# Patient Record
Sex: FEMALE
[De-identification: names, ages, dates, MRNs, and addresses within clinical notes are randomized per-mention and may not be internally consistent; named-entity substitution may affect disease eponyms.]

---

## 2017-02-10 ENCOUNTER — CHARTING TRANS (OUTPATIENT)
Dept: OTHER | Age: 22
End: 2017-02-10

## 2017-02-10 ENCOUNTER — LAB SERVICES (OUTPATIENT)
Dept: OTHER | Age: 22
End: 2017-02-10

## 2017-02-10 LAB — RAPID STREP GROUP A: NORMAL

## 2018-11-05 VITALS
OXYGEN SATURATION: 97 % | BODY MASS INDEX: 30.81 KG/M2 | HEART RATE: 77 BPM | SYSTOLIC BLOOD PRESSURE: 100 MMHG | TEMPERATURE: 98.4 F | DIASTOLIC BLOOD PRESSURE: 72 MMHG | HEIGHT: 66 IN | WEIGHT: 191.7 LBS

## 2023-11-08 ENCOUNTER — TELEMEDICINE (OUTPATIENT)
Dept: BEHAVIORAL HEALTH | Facility: CLINIC | Age: 28
End: 2023-11-08
Payer: COMMERCIAL

## 2023-11-08 DIAGNOSIS — F42.2 MIXED OBSESSIONAL THOUGHTS AND ACTS: ICD-10-CM

## 2023-11-08 PROBLEM — F42.9 OCD (OBSESSIVE COMPULSIVE DISORDER): Status: ACTIVE | Noted: 2023-11-08

## 2023-11-08 PROBLEM — F90.0 ADHD (ATTENTION DEFICIT HYPERACTIVITY DISORDER), INATTENTIVE TYPE: Status: ACTIVE | Noted: 2023-11-08

## 2023-11-08 PROBLEM — F41.0 PANIC ATTACKS: Status: ACTIVE | Noted: 2023-11-08

## 2023-11-08 PROBLEM — G25.69 TICS OF ORGANIC ORIGIN: Status: ACTIVE | Noted: 2023-11-08

## 2023-11-08 PROCEDURE — 99214 OFFICE O/P EST MOD 30 MIN: CPT | Performed by: PSYCHIATRY & NEUROLOGY

## 2023-11-08 RX ORDER — ESCITALOPRAM OXALATE 10 MG/1
10 TABLET ORAL DAILY
Qty: 90 TABLET | Refills: 1 | Status: SHIPPED | OUTPATIENT
Start: 2023-11-08 | End: 2023-11-13 | Stop reason: DRUGHIGH

## 2023-11-08 RX ORDER — ESCITALOPRAM OXALATE 10 MG/1
TABLET ORAL
COMMUNITY
End: 2023-11-08 | Stop reason: SDUPTHER

## 2023-11-08 RX ORDER — NORETHINDRONE ACETATE/ETHINYL ESTRADIOL 1.5-0.03MG
1 KIT ORAL DAILY
COMMUNITY
Start: 2023-10-25

## 2023-11-08 RX ORDER — SEMAGLUTIDE 1.34 MG/ML
INJECTION, SOLUTION SUBCUTANEOUS
COMMUNITY
Start: 2023-02-16

## 2023-11-08 RX ORDER — MUPIROCIN 20 MG/G
OINTMENT TOPICAL
COMMUNITY
Start: 2023-03-21

## 2023-11-08 RX ORDER — HYDROXYZINE PAMOATE 25 MG/1
CAPSULE ORAL EVERY 8 HOURS PRN
COMMUNITY
Start: 2019-10-11 | End: 2023-11-08

## 2023-11-08 RX ORDER — PROPRANOLOL HYDROCHLORIDE 10 MG/1
0.5 TABLET ORAL 2 TIMES DAILY
COMMUNITY
Start: 2020-01-30

## 2023-11-08 RX ORDER — VENLAFAXINE 37.5 MG/1
TABLET ORAL 2 TIMES DAILY
COMMUNITY

## 2023-11-08 RX ORDER — NORGESTIMATE AND ETHINYL ESTRADIOL 7DAYSX3 LO
KIT ORAL
COMMUNITY
Start: 2022-11-26

## 2023-11-08 NOTE — PROGRESS NOTES
"Outpatient Child and Adolescent Psychiatry      Subjective   Lety Torres, a 28 y.o. female, for medication management follow up  Patient with seen virtually.     Chief Complaint:  Chief Complaint   Patient presents with    OCD (Obsessive-Compulsive Disorder)    Panic Attack    Anxiety        HPI:   Since last visit, Lety lost 20# on Ozempic, \"I just could not eat; the thought of food made me nauseous so dose was decreased.\" She was on a low dose and has since stopped it. Now on metformin 500mg once daily for one month with a plan to increase to twice daily next week. Helps to control cravings and minimize binge eating. Taking Lexapro 15mg and feels like it's helping. Wonders if it contributes to difficulty losing weight, but likely not contributing. Work is going well. With stress, she picks more and her OCD thoughts intensify. Lety wants to stop picking thumbnails. No side effects, no sedation or hypotension. No SI, HI, no SIB. Future oriented. Good nutrition, exercise and sleep.      Past med trials:  Vyvanse gave her a jittery feeling   HALDOL caused severe Akathisia at a low dose  clonazepam and lorazepam: very sedating  sertraline: only slightly helpful tried through 8th grade  escitalopram [id number] helped at first but lost efficacy after several years  Higher strength and sustained release adderall caused anxiety and insomnia in the past  vyvanse can't remember, but not as good as adderall  adderall: better as short acting, helped with focus, but made her jittery, too much on edge, long acting made her more jittery   venlafaxine: Helped with anxiety, obsessive thinking but caused night sweats and hot flashes     Depression: Denies   Appetite: Manageable  Sleep: good  Anxiety: Manageable    Melissa: None  Attention: Good  Impulse control and behavioral concerns: Good  Trauma/Stressors: Denies  OCD: Denies  Perceptual disturbances and delusions: Denies  Substance use: Denies  Denies suicidal or homicidal " "ideations, plan or intent    There were no vitals filed for this visit.     Mental Status Exam:  Appearance: 28 y.o. fe/male sitting comfortably in chair at desk during interview. Casually dressed. Appropriate hygiene and grooming.  Behavior: Calm and cooperative. Appropriate eye contact. No abnormal motor activity observed.  Speech: Normal rate, rhythm, volume, tone, and prosody. Normal speech latency.  Cognitive: Sustains attention and conversation throughout interview; grossly oriented to [relative] time, self, place, and situation; recent and remote recall are intact  Mood: \"Good\"  Affect: Stable, euthymic, smiles easily  Though process: Organized/linear and goal-directed  Thought Content: No suicidal ideation/intent/plan. No homicidal ideation/intent/plan.  Perception: Denies auditory and visual hallucinations. No internal stimulation observed. Reality testing is ostensibly intact during interview.  Insight: good  Judgment: good    Current Medications:    Current Outpatient Medications:     escitalopram (Lexapro) 10 mg tablet, TAKE 1 & 1/2 TABLETS BY MOUTH DAILY, Disp: , Rfl:     hydrOXYzine pamoate (Vistaril) 25 mg capsule, Take by mouth every 8 hours if needed., Disp: , Rfl:     Joceline 1.5/30, 21, 1.5-30 mg-mcg tablet tablet, Take 1 tablet by mouth once daily., Disp: , Rfl:     mupirocin (Bactroban) 2 % ointment, APPLY SPARINGLY TO AFFECTED AREA(S) THREE TIMES A DAY, Disp: , Rfl:     Ozempic 0.25 mg or 0.5 mg(2 mg/1.5 mL) pen injector, inject 0.5 mg subcutaneously one time a week., Disp: , Rfl:     propranolol (Inderal) 10 mg tablet, Take 0.5 tablets (5 mg) by mouth 2 times a day., Disp: , Rfl:     Tri-Lo-Sprintec 0.18/0.215/0.25 mg-25 mcg tablet, , Disp: , Rfl:     venlafaxine (Effexor) 37.5 mg tablet, Take by mouth twice a day., Disp: , Rfl:       Treatment Plan/Recommendations:     1) Continue escitalopram 15mg daily for anxiety and skin picking  2) Continue propranolol 5mg as needed for panic  3) Consider " "starting NAC and therapy for body focused repetitive behaviors and consider reading, \"Overcoming body focused repetitive behaviors\"  4) Continue metformin for pre-diabetes through PMD, continue yoga, running, walking, mindfulness to manage anxiety  5) Nice to see you and please come back in 3-4 months     Follow-up plan for psychiatric condition was discussed with patient and family  Take medication as prescribed; risks, benefits and alternatives of medication were explained, including but not limited to changes in mood, sleep, appetite, increased risks of suicidal ideations, etc. Family and patient verbalized understanding and provided verbal consent for treatment  Therapy: Continue therapy services  Call 911 or go to the nearest emergency room should suicidal ideations emerge  Patient instructed to call the office should new questions or concerns arise after office visit    Safety Risk Assessment:   Acute risk for harm to self/others: low  Chronic risk for harm to self/others: low    Kierra Minaya MD        "

## 2023-11-12 ENCOUNTER — PATIENT MESSAGE (OUTPATIENT)
Dept: BEHAVIORAL HEALTH | Facility: CLINIC | Age: 28
End: 2023-11-12
Payer: COMMERCIAL

## 2023-11-12 DIAGNOSIS — F41.0 PANIC ATTACKS: ICD-10-CM

## 2023-11-13 RX ORDER — ESCITALOPRAM OXALATE 10 MG/1
15 TABLET ORAL DAILY
Qty: 135 TABLET | Refills: 1 | Status: SHIPPED | OUTPATIENT
Start: 2023-11-13 | End: 2024-01-03

## 2023-12-29 DIAGNOSIS — F41.0 PANIC ATTACKS: ICD-10-CM

## 2024-01-03 RX ORDER — ESCITALOPRAM OXALATE 10 MG/1
TABLET ORAL
Qty: 135 TABLET | Refills: 0 | Status: SHIPPED | OUTPATIENT
Start: 2024-01-03

## 2024-01-23 ENCOUNTER — PATIENT MESSAGE (OUTPATIENT)
Dept: BEHAVIORAL HEALTH | Facility: CLINIC | Age: 29
End: 2024-01-23
Payer: COMMERCIAL
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
## 2024-02-14 ENCOUNTER — APPOINTMENT (OUTPATIENT)
Dept: BEHAVIORAL HEALTH | Facility: CLINIC | Age: 29
End: 2024-02-14
Payer: COMMERCIAL

## 2024-04-03 DIAGNOSIS — F41.0 PANIC ATTACKS: ICD-10-CM

## 2024-04-03 RX ORDER — ESCITALOPRAM OXALATE 10 MG/1
TABLET ORAL
Qty: 135 TABLET | Refills: 0 | OUTPATIENT
Start: 2024-04-03

## 2024-04-10 ENCOUNTER — APPOINTMENT (OUTPATIENT)
Dept: BEHAVIORAL HEALTH | Facility: CLINIC | Age: 29
End: 2024-04-10
Payer: COMMERCIAL

## 2024-11-18 DIAGNOSIS — F41.0 PANIC ATTACKS: Primary | ICD-10-CM

## 2024-11-18 RX ORDER — ESCITALOPRAM OXALATE 20 MG/1
20 TABLET ORAL DAILY
Qty: 30 TABLET | Refills: 2 | Status: SHIPPED | OUTPATIENT
Start: 2024-11-18 | End: 2025-02-16

## 2024-12-11 ENCOUNTER — APPOINTMENT (OUTPATIENT)
Dept: BEHAVIORAL HEALTH | Facility: CLINIC | Age: 29
End: 2024-12-11
Payer: COMMERCIAL

## 2024-12-11 DIAGNOSIS — F90.0 ADHD (ATTENTION DEFICIT HYPERACTIVITY DISORDER), INATTENTIVE TYPE: Primary | ICD-10-CM

## 2024-12-11 DIAGNOSIS — F41.0 PANIC ATTACKS: ICD-10-CM

## 2024-12-11 PROCEDURE — 99214 OFFICE O/P EST MOD 30 MIN: CPT | Performed by: PSYCHIATRY & NEUROLOGY

## 2024-12-11 RX ORDER — LISDEXAMFETAMINE DIMESYLATE 10 MG/1
10 TABLET, CHEWABLE ORAL DAILY
Qty: 30 TABLET | Refills: 0 | Status: SHIPPED | OUTPATIENT
Start: 2024-12-11 | End: 2025-01-10

## 2024-12-11 RX ORDER — PROPRANOLOL HYDROCHLORIDE 10 MG/1
5 TABLET ORAL 2 TIMES DAILY PRN
Qty: 30 TABLET | Refills: 1 | Status: SHIPPED | OUTPATIENT
Start: 2024-12-11 | End: 2025-02-09

## 2024-12-11 NOTE — PROGRESS NOTES
"Outpatient Child and Adolescent Psychiatry      Subjective   Lety Torres, a 29 y.o. female, for medication management follow up. Patient seen in person.    Chief Complaint:  Chief Complaint   Patient presents with    ADHD    Anxiety        HPI:     Since last visit in November, 2023, Lety has done well. She is working for the [employer] as a  in person 5 days per week and loves it. She likes the work, the people and the routine of going downtown. Tried therapy for skin picking, with only modest success. Now wearing band aids on thumbs. Physical barrier prevents her from picking, so this has been effective. She had to stop taking Ozempic due to shortage and insurance issues. Saw endocrine, started on phentermine (adipex), which worsened anxiety. Lety notes, \"I was crawling out of my skin, I couldn't focus,\" so stopped after 8 days. Adderall helped her focus, but made her very shaky also. We discussed how sensitive she is to medications and their side effects. Anxiety has been under good control. She temporarily saw someone who increased escitalopram to 20mg and it's been helpful, but when insurance changed, she elected to return to care with writer. She continues to take metformin. She continues to experience daytime sedation, so will have sleep study to rule out sleep apnea. No side effects. No SI, HI, no SIB. Future oriented. Good nutrition, exercise and sleep.      Past med trials:  Adderall ineffective at low dose, then caused anxiety/jitters at higher dose  Vyvanse gave her a jittery feeling   HALDOL caused severe Akathisia at a low dose  clonazepam and lorazepam: very sedating  sertraline: only slightly helpful tried through 8th grade  escitalopram [id number] helped at first but lost efficacy after several years  Higher strength and sustained release adderall caused anxiety and insomnia in the past  adderall: better as short acting, helped with focus, but made her jittery, too " "much on edge, long acting made her more jittery   venlafaxine: Helped with anxiety, obsessive thinking but caused night sweats and hot flashes     Depression: Denies   Appetite: unchanged  Sleep: unchanged  Anxiety: responding well to escitalopram  Melissa: None  Attention: fair-poor  Impulse control and behavioral concerns: picks at thumbs  Trauma/Stressors: Denies  OCD: see hpi  Perceptual disturbances and delusions: Denies  Substance use: Denies  Denies suicidal or homicidal ideations, plan or intent    There were no vitals filed for this visit.     Mental Status Exam:  Appearance: 29 y.o. female sitting comfortably in chair during interview. Casually dressed. Appropriate hygiene and grooming.  Behavior: Calm, engaged, polite and cooperative. Appropriate eye contact. No abnormal motor activity observed.  Speech: Normal rate, rhythm, volume, tone, and prosody. Normal speech latency.  Cognitive: Sustains attention and conversation throughout interview; grossly oriented to time, self, place, and situation; recent and remote recall are intact  Mood: “It's been good lately\"  Affect: Stable, euthymic  Though process: Organized/linear and goal-directed  Thought Content: No suicidal ideation/intent/plan. No homicidal ideation/intent/plan.  Perception: Denies auditory and visual hallucinations. No internal stimulation observed. Reality testing is ostensibly intact during interview.  Insight: good  Judgment: good    Current Medications:    Current Outpatient Medications:     escitalopram (Lexapro) 20 mg tablet, Take 1 tablet (20 mg) by mouth once daily., Disp: 30 tablet, Rfl: 2    lisdexamfetamine (Vyvanse) 10 mg tablet,chewable chewable tablet, Chew 1 tablet (10 mg) once daily., Disp: 30 tablet, Rfl: 0    mupirocin (Bactroban) 2 % ointment, APPLY SPARINGLY TO AFFECTED AREA(S) THREE TIMES A DAY, Disp: , Rfl:     Ozempic 0.25 mg or 0.5 mg(2 mg/1.5 mL) pen injector, inject 0.5 mg subcutaneously one time a week., Disp: , Rfl: " "    propranolol (Inderal) 10 mg tablet, Take 0.5 tablets (5 mg) by mouth 2 times a day as needed (as needed for anxiety, panic)., Disp: 30 tablet, Rfl: 1      Assessment/Plan   Diagnosis:  Problem List Items Addressed This Visit             ICD-10-CM    Panic attacks F41.0    Relevant Medications    propranolol (Inderal) 10 mg tablet    Other Relevant Orders    Follow Up In Pediatric Psychiatry    ADHD (attention deficit hyperactivity disorder), inattentive type - Primary F90.0    Relevant Medications    lisdexamfetamine (Vyvanse) 10 mg tablet,chewable chewable tablet    Other Relevant Orders    Follow Up In Pediatric Psychiatry          Treatment Plan/Recommendations:     1) Discussed options and decided to start low dose Vyvanse chewable, 5 mg daily (10mg, take half tab) and increase by 5mg each week for ADHD and binge eating to the point of side effects (in the past, higher doses of stimulants caused shakiness and worsening of anxiety); disc r/b/alt   2) Resume propranolol 5mg and take up to twice daily as needed for panic  3) Continue escitalopram 20mg daily for anxiety and skin picking  4) Continue to use band-aids as a barrier to prevent picking, continue to learn about body focused repetitive behaviors and consider reading, \"Overcoming body focused repetitive behaviors\"  5) Continue metformin for pre-diabetes and PCOS through PMD, Vitamin D was 40 when checked at [facility] in October, 2024, continue multivitamin, exercise, mindfulness   5) Nice to see you and please email me in 3-4 weeks with an update on your sleep study and how the Vyvanse trial is going then come back in 2-3 months    Follow-up plan for psychiatric condition was discussed with patient and family  Take medication as prescribed; risks, benefits and alternatives of medication were explained, including but not limited to changes in mood, sleep, appetite, increased risks of suicidal ideations, etc. Family and patient verbalized understanding and " provided verbal consent for treatment  Therapy: Continue therapy services  Call 911 or go to the nearest emergency room should suicidal ideations emerge  Patient instructed to call the office should new questions or concerns arise after office visit    Safety Risk Assessment:   Acute risk for harm to self/others: low  Chronic risk for harm to self/others: low    Problem List Items Addressed This Visit       Panic attacks    Relevant Medications    propranolol (Inderal) 10 mg tablet    Other Relevant Orders    Follow Up In Pediatric Psychiatry    ADHD (attention deficit hyperactivity disorder), inattentive type - Primary    Relevant Medications    lisdexamfetamine (Vyvanse) 10 mg tablet,chewable chewable tablet    Other Relevant Orders    Follow Up In Pediatric Psychiatry        Follow-up:    Kierra Minaya MD

## 2025-02-15 DIAGNOSIS — F41.0 PANIC ATTACKS: ICD-10-CM

## 2025-02-16 RX ORDER — ESCITALOPRAM OXALATE 20 MG/1
20 TABLET ORAL DAILY
Qty: 30 TABLET | Refills: 2 | Status: SHIPPED | OUTPATIENT
Start: 2025-02-16

## 2025-03-12 ENCOUNTER — APPOINTMENT (OUTPATIENT)
Dept: BEHAVIORAL HEALTH | Facility: CLINIC | Age: 30
End: 2025-03-12
Payer: COMMERCIAL

## 2025-03-12 DIAGNOSIS — F90.0 ADHD (ATTENTION DEFICIT HYPERACTIVITY DISORDER), INATTENTIVE TYPE: ICD-10-CM

## 2025-03-12 DIAGNOSIS — F41.0 PANIC ATTACKS: Primary | ICD-10-CM

## 2025-03-12 PROCEDURE — 99214 OFFICE O/P EST MOD 30 MIN: CPT | Performed by: PSYCHIATRY & NEUROLOGY

## 2025-03-12 RX ORDER — PROPRANOLOL HYDROCHLORIDE 10 MG/1
10 TABLET ORAL 2 TIMES DAILY PRN
Qty: 180 TABLET | Refills: 1 | Status: SHIPPED | OUTPATIENT
Start: 2025-03-12 | End: 2025-09-08

## 2025-03-12 RX ORDER — ESCITALOPRAM OXALATE 20 MG/1
20 TABLET ORAL DAILY
Qty: 90 TABLET | Refills: 1 | Status: SHIPPED | OUTPATIENT
Start: 2025-03-12 | End: 2025-09-08

## 2025-03-12 NOTE — PROGRESS NOTES
"Outpatient Child and Adolescent Psychiatry      Subjective   Lety Torres, a 30 y.o. female, for medication management follow up. Patient seen virtually.    Chief Complaint:  Chief Complaint   Patient presents with    Anxiety    ADHD        HPI:   Since last visit, Lety reports \"The stimulants just are not good, they do something to me,\" that makes things worse, feels anxious and uncomfortable, so not taking Vyvanse. Overall, she is feeling good, anxiety is under control, she likes her work. Propranolol 10mg almost daily and finds that it helps her with anxiety, as well as her focus. No sedation, no decrease in heart rate. She had been wearing bandaids on her thumbs around the clock for six weeks as a barrier method to avoid picking. As of now, she states, \"I'm trying to wean myself off the band aids.\" She notes, \"The urge is not there as much.\" If she has the urge to pick, she puts bandaids back on. Hopes to get a manicure someday, which keeps her motivated. This is more progress than she's ever made before in terms of picking, which brings her jeannie. Likes her work, [employer] as a . Not as much daytime sedation, but feels fatigued even when she gets 8+ hours of sleep, does not take hydroxyzine and exercises regularly. Sees PMD regularly. Some joint pain. Has not seen rheumatology. Thyroid studies WNL. Sleep study was going to be cost prohibitive, so unable to get it done. No side effects. No SI, HI, no SIB. Future oriented. Good nutrition, exercise and sleep.      Past med trials:  Adderall ineffective at low dose, then caused anxiety/jitters at higher dose  Vyvanse worsened anxiety, jittery feeling   HALDOL caused severe Akathisia at a low dose  clonazepam and lorazepam: very sedating  sertraline: only slightly helpful tried through 8th grade  escitalopram [id number] helped at first but lost efficacy after several years  Higher strength and sustained release adderall caused anxiety and " "insomnia in the past  adderall: better as short acting, helped with focus, but made her jittery, too much on edge, long acting made her more jittery   venlafaxine: Helped with anxiety, obsessive thinking but caused night sweats and hot flashes     Depression: Denies   Appetite: robust  Sleep: 8+ hours per night  Anxiety: improving    Melissa: None  Attention: good  Impulse control and behavioral concerns: none  Trauma/Stressors: Denies  Perceptual disturbances and delusions: Denies  Substance use: Denies  Denies suicidal or homicidal ideations, plan or intent    There were no vitals filed for this visit.     Mental Status Exam:  Appearance: 30 y.o. female sitting comfortably in chair during interview. Casually dressed, good hygiene and grooming.  Behavior: Calm, polite, engaged, cooperative, good eye contact. No abnormal motor activity observed.  Speech: Normal rate, rhythm, volume, tone, and prosody. Normal speech latency.  Cognitive: Sustains attention and conversation throughout interview; grossly oriented to [relative] time, self, place, and situation; recent and remote recall are intact  Mood: “Pretty good; I'm really doing well right now\"  Affect: Stable, euthymic, smiles regularly, mildly anxious  Though process: Organized/linear and goal-directed  Thought Content: No suicidal ideation/intent/plan. No homicidal ideation/intent/plan.  Perception: Denies auditory and visual hallucinations. No internal stimulation observed. Reality testing is ostensibly intact during interview.  Insight: excellent  Judgment: excellent    Current Medications:    Current Outpatient Medications:     escitalopram (Lexapro) 20 mg tablet, TAKE ONE TABLET BY MOUTH EVERY DAY, Disp: 30 tablet, Rfl: 2    lisdexamfetamine (Vyvanse) 10 mg tablet,chewable chewable tablet, Chew 1 tablet (10 mg) once daily., Disp: 30 tablet, Rfl: 0    mupirocin (Bactroban) 2 % ointment, APPLY SPARINGLY TO AFFECTED AREA(S) THREE TIMES A DAY, Disp: , Rfl:     " Ozempic 0.25 mg or 0.5 mg(2 mg/1.5 mL) pen injector, inject 0.5 mg subcutaneously one time a week., Disp: , Rfl:     propranolol (Inderal) 10 mg tablet, TAKE ONE-HALF TABLET BY MOUTH TWICE DAILY as needed for anxiety, panic., Disp: 30 tablet, Rfl: 0      Assessment/Plan   Diagnosis:  Problem List Items Addressed This Visit             ICD-10-CM    Panic attacks F41.0    ADHD (attention deficit hyperactivity disorder), inattentive type F90.0      Treatment Plan/Recommendations:     1) Discontinued Vyvanse due to worsening of anxiety, feelings of jitteriness despite very low dose; disc r/b/alt   2) Continue propranolol 5mg up to twice daily as needed for panic  3) Continue escitalopram 20mg daily for anxiety and skin picking  4) Continue to use band-aids as a barrier to prevent picking and attempt to gradually wean off as tolerated  5) Continue metformin for pre-diabetes and PCOS through PMD, ask about fibromyalgia, chronic fatigue syndrome, consider integrative medicine consult with Dr. Rodriguez for GI issues, connective tissue, pain, low energy, continue multivitamin, exercise, mindfulness   5) Nice to see you and please come back in 3-4 months    Follow-up plan for psychiatric condition was discussed with patient and family  Take medication as prescribed; risks, benefits and alternatives of medication were explained, including but not limited to changes in mood, sleep, appetite, increased risks of suicidal ideations, etc. Family and patient verbalized understanding and provided verbal consent for treatment  Therapy: Continue therapy services  Call 911 or go to the nearest emergency room should suicidal ideations emerge  Patient instructed to call the office should new questions or concerns arise after office visit    Safety Risk Assessment:   Acute risk for harm to self/others: low  Chronic risk for harm to self/others: low    Problem List Items Addressed This Visit       Panic attacks    ADHD (attention deficit  hyperactivity disorder), inattentive type        Follow-up:    Kierra Minaya MD

## 2025-07-09 ENCOUNTER — APPOINTMENT (OUTPATIENT)
Dept: BEHAVIORAL HEALTH | Facility: CLINIC | Age: 30
End: 2025-07-09
Payer: COMMERCIAL

## 2025-07-09 DIAGNOSIS — F41.0 PANIC ATTACKS: ICD-10-CM

## 2025-07-09 DIAGNOSIS — F90.0 ADHD (ATTENTION DEFICIT HYPERACTIVITY DISORDER), INATTENTIVE TYPE: ICD-10-CM

## 2025-07-09 PROCEDURE — 99214 OFFICE O/P EST MOD 30 MIN: CPT | Performed by: PSYCHIATRY & NEUROLOGY

## 2025-07-09 RX ORDER — ESCITALOPRAM OXALATE 20 MG/1
20 TABLET ORAL DAILY
Qty: 90 TABLET | Refills: 1 | Status: SHIPPED | OUTPATIENT
Start: 2025-07-09 | End: 2026-01-05

## 2025-07-09 RX ORDER — PROPRANOLOL HYDROCHLORIDE 10 MG/1
10 TABLET ORAL 2 TIMES DAILY PRN
Qty: 180 TABLET | Refills: 1 | Status: SHIPPED | OUTPATIENT
Start: 2025-07-09 | End: 2026-01-05

## 2025-07-09 NOTE — PROGRESS NOTES
"[facility]      Subjective   Lety Torres, a 30 y.o. female, for medication management follow up. Patient seen virtually.      Chief Complaint:  Chief Complaint   Patient presents with    ADHD    Anxiety        HPI:     Since last visit, Lety has done well. She reports \"I've had no urges to pick my nails at all.\" She used band aids for months and now has been able to get a manicure. Very excited that her nails now look normal, \"It feels amazing, I couldn't be happier.\" The picking was \"exhausting,\" and caused her to be distracted. She can now be more present. Still has chronic fatigue, ankles, joints hurt, she can't lose weight. She has IBS, reacts poorly to fructose, abnormal blood sugar and abnormal LFTs. Seeing Dr. Barros, functional medicine at [facility] and labs suggest, \"My hormones are out of whack, my cortisol is really high, and I have inflammation.\" She meets with them next week to develop a plan. Escitalopram and propranolol are very helpful. Takes propranolol at about 7:30am and it helps all day. No dizziness or sedation. Anxiety and \"brain fog\" are there, but not impairing. No side effects. No SI, HI, no SIB. Future oriented. Good nutrition, exercise and sleep.     Works for [employer],     Past med trials:  Adderall ineffective at low dose, then caused anxiety/jitters at higher dose  Vyvanse worsened anxiety, jittery feeling   HALDOL caused severe Akathisia at a low dose  clonazepam and lorazepam: very sedating  sertraline: only slightly helpful tried through 8th grade  escitalopram [id number] helped at first but lost efficacy after several years  Higher strength and sustained release adderall caused anxiety and insomnia in the past  adderall: better as short acting, helped with focus, but made her jittery, too much on edge, long acting made her more jittery   venlafaxine: Helped with anxiety, obsessive thinking but caused night sweats and hot " "flashes     Depression: Denies   Appetite: unchanged  Sleep: unchanged  Anxiety: under good control  Melissa: None  Attention: fair-good  Impulse control and behavioral concerns: none  Trauma/Stressors: Denies  OCD: improving  Perceptual disturbances and delusions: Denies  Substance use: Denies  Denies suicidal or homicidal ideations, plan or intent    There were no vitals filed for this visit.     Mental Status Exam:  Appearance: 30 y.o. female sitting comfortably in chair during interview. Casually dressed. Appropriate hygiene and grooming.  Behavior: Calm, engaged, polite, cooperative, good eye contact. No abnormal motor activity observed.  Speech: Normal rate, rhythm, volume, tone, and prosody. Normal speech latency.  Cognitive: Sustains attention and conversation throughout interview; grossly oriented to time, self, place, and situation; recent and remote recall are intact  Mood: “Good\"  Affect: Stable, euthymic   Though process: Organized/linear and goal-directed  Thought Content: No suicidal ideation/intent/plan. No homicidal ideation/intent/plan.  Perception: Denies auditory and visual hallucinations. No internal stimulation observed. Reality testing is ostensibly intact during interview.  Insight: good  Judgment: good    Current Medications:  Current Medications[1]      Assessment/Plan   Diagnosis:  Problem List Items Addressed This Visit           ICD-10-CM    Panic attacks F41.0    ADHD (attention deficit hyperactivity disorder), inattentive type F90.0          Treatment Plan/Recommendations:     1) Continue escitalopram 20mg daily for anxiety and skin picking  2) Continue propranolol 5mg up to twice daily as needed for panic (helpful when takes it at 7:30am with no side effects)  3) Continue to get manicures to prevent picking (and enjoy your healthy nails!), continue care with Dr. Barros, continue metformin for pre-diabetes and PCOS and consider starting psychotherapy to continue learning about " yourself  4) Nice to see you and please come back in 3-4 months    Follow-up plan for psychiatric condition was discussed with patient and family  Take medication as prescribed; risks, benefits and alternatives of medication were explained, including but not limited to changes in mood, sleep, appetite, increased risks of suicidal ideations, etc. Family and patient verbalized understanding and provided verbal consent for treatment  Therapy: Continue therapy services  Call 911 or go to the nearest emergency room should suicidal ideations emerge  Patient instructed to call the office should new questions or concerns arise after office visit    Safety Risk Assessment:   Acute risk for harm to self/others: low  Chronic risk for harm to self/others: low    Problem List Items Addressed This Visit       Panic attacks    ADHD (attention deficit hyperactivity disorder), inattentive type        Follow-up:    Kierra Minaya MD             [1]   Current Outpatient Medications:     escitalopram (Lexapro) 20 mg tablet, TAKE ONE TABLET BY MOUTH EVERY DAY, Disp: 30 tablet, Rfl: 0    mupirocin (Bactroban) 2 % ointment, APPLY SPARINGLY TO AFFECTED AREA(S) THREE TIMES A DAY, Disp: , Rfl:     propranolol (Inderal) 10 mg tablet, Take 1 tablet (10 mg) by mouth 2 times a day as needed (anxiety or panic)., Disp: 180 tablet, Rfl: 1

## 2025-11-12 ENCOUNTER — APPOINTMENT (OUTPATIENT)
Dept: BEHAVIORAL HEALTH | Facility: CLINIC | Age: 30
End: 2025-11-12
Payer: COMMERCIAL

## 2025-11-18 ENCOUNTER — APPOINTMENT (OUTPATIENT)
Dept: INTEGRATIVE MEDICINE | Facility: CLINIC | Age: 30
End: 2025-11-18
Payer: COMMERCIAL